# Patient Record
Sex: FEMALE | Race: WHITE | HISPANIC OR LATINO | Employment: FULL TIME | URBAN - METROPOLITAN AREA
[De-identification: names, ages, dates, MRNs, and addresses within clinical notes are randomized per-mention and may not be internally consistent; named-entity substitution may affect disease eponyms.]

---

## 2017-02-23 PROBLEM — E89.3 HYPOPITUITARISM AFTER ADENOMA RESECTION: Status: ACTIVE | Noted: 2017-02-23

## 2017-06-01 PROBLEM — R73.02 IGT (IMPAIRED GLUCOSE TOLERANCE): Chronic | Status: ACTIVE | Noted: 2017-06-01

## 2019-12-02 PROBLEM — E89.3 HYPOPITUITARISM AFTER ADENOMA RESECTION: Chronic | Status: ACTIVE | Noted: 2017-02-23

## 2020-11-17 PROBLEM — K62.5 RECTAL BLEEDING: Status: ACTIVE | Noted: 2020-11-17

## 2020-11-17 PROBLEM — Z86.010 HX OF ADENOMATOUS COLONIC POLYPS: Status: ACTIVE | Noted: 2020-11-17

## 2021-01-20 ENCOUNTER — TELEPHONE (OUTPATIENT)
Dept: ADMINISTRATIVE | Facility: OTHER | Age: 46
End: 2021-01-20

## 2021-01-20 ENCOUNTER — PATIENT MESSAGE (OUTPATIENT)
Dept: ADMINISTRATIVE | Facility: OTHER | Age: 46
End: 2021-01-20

## 2021-07-13 PROBLEM — R21 RASH AND NONSPECIFIC SKIN ERUPTION: Status: ACTIVE | Noted: 2021-07-13

## 2022-01-18 PROBLEM — B35.1 ONYCHOMYCOSIS: Status: ACTIVE | Noted: 2022-01-18

## 2022-02-09 ENCOUNTER — OFFICE VISIT (OUTPATIENT)
Dept: ORTHOPEDICS | Facility: CLINIC | Age: 47
End: 2022-02-09

## 2022-02-09 DIAGNOSIS — M75.51 BURSITIS OF RIGHT SHOULDER: Primary | ICD-10-CM

## 2022-02-09 PROCEDURE — 99213 OFFICE O/P EST LOW 20 MIN: CPT | Mod: PBBFAC | Performed by: PHYSICIAN ASSISTANT

## 2022-02-09 PROCEDURE — 99999 PR PBB SHADOW E&M-EST. PATIENT-LVL III: CPT | Mod: PBBFAC,,, | Performed by: PHYSICIAN ASSISTANT

## 2022-02-09 PROCEDURE — 99203 OFFICE O/P NEW LOW 30 MIN: CPT | Mod: S$PBB,,, | Performed by: PHYSICIAN ASSISTANT

## 2022-02-09 PROCEDURE — 99203 PR OFFICE/OUTPT VISIT, NEW, LEVL III, 30-44 MIN: ICD-10-PCS | Mod: S$PBB,,, | Performed by: PHYSICIAN ASSISTANT

## 2022-02-09 PROCEDURE — 99999 PR PBB SHADOW E&M-EST. PATIENT-LVL III: ICD-10-PCS | Mod: PBBFAC,,, | Performed by: PHYSICIAN ASSISTANT

## 2022-02-10 NOTE — PROGRESS NOTES
SUBJECTIVE:     Chief Complaint & History of Present Illness:  Rachana Lee is a  New  patient 46 y.o. female who is seen here today with a complaint of    Chief Complaint   Patient presents with    Right Shoulder - Pain    .  Patient is here today for continuing care treatment for sudden onset right shoulder pain following lifting and caring multiple boxes.  Was seen in the emergency room 02/07/2022.  Examination x-rays at that time diagnosed with bursitis per discharge with ibuprofen 800 mg.  Continues to struggle with increasing pain and decreasing range of motion of the shoulder.  On a scale of 1-10, with 10 being worst pain imaginable, he rates this pain as 5 on good days and 8 on bad days.  she describes the pain as tender and sore.    Review of patient's allergies indicates:   Allergen Reactions    Pineapple Anaphylaxis     If eats and rash if touch         Current Outpatient Medications   Medication Sig Dispense Refill    amitriptyline (ELAVIL) 10 MG tablet TAKE 1 TABLET BY MOUTH NIGHTLY AS NEEDED FOR INSOMNIA 30 tablet 11    hydrocortisone (ANUSOL-HC) 2.5 % rectal cream Place rectally 2 (two) times daily. 1 Tube 2    ibuprofen (ADVIL,MOTRIN) 800 MG tablet Take 1 tablet (800 mg total) by mouth every 6 (six) hours as needed for Pain. 20 tablet 0    levothyroxine (SYNTHROID) 137 MCG Tab tablet TAKE 1 TABLET BY MOUTH EVERY DAY BEFORE BREAKFAST 90 tablet 3    lisinopriL 10 MG tablet TAKE 1 TABLET BY MOUTH EVERY EVENING 90 tablet 3    lovastatin (MEVACOR) 20 MG tablet Take 1 tablet (20 mg total) by mouth every evening. 90 tablet 3    omega-3 fatty acids/fish oil (FISH OIL-OMEGA-3 FATTY ACIDS) 300-1,000 mg capsule Take 2 capsules by mouth 2 (two) times daily. Take as directed to treat hypertriglyceridemia and to reduce the risk of cardiovascular disease. 360 capsule 3    predniSONE (DELTASONE) 5 MG tablet Take 1 tablet (5 mg total) by mouth every evening. 90 tablet 3    terbinafine  HCL (LAMISIL) 250 mg tablet Take 1 tablet (250 mg total) by mouth once daily. 90 tablet 1    omega-3 fatty acids 1,000 mg Cap Take 2 capsules (2,000 mg total) by mouth 2 (two) times daily. Take as directed for triglycerides and to decrease risk of heart disease and stroke.  0     No current facility-administered medications for this visit.       Past Medical History:   Diagnosis Date    Acromegaly     Cortisol deficiency     Encounter for blood transfusion     High cholesterol     Hypertension     Thyroid disease        Past Surgical History:   Procedure Laterality Date     SECTION      COLONOSCOPY N/A 11/3/2016    Procedure: COLONOSCOPY;  Surgeon: Luis Bogran-Reyes, MD;  [facility];  Service: Endoscopy;  Laterality: N/A;    COLONOSCOPY N/A 2020    Procedure: COLONOSCOPY;  Surgeon: Allan Bliss MD;  [facility];  Service: General;  Laterality: N/A;    PITUITARY SURGERY      in [place]        Vital Signs (Most Recent)  There were no vitals filed for this visit.    Review of Systems:  ROS:  Constitutional: no fever or chills  Eyes: no visual changes  ENT: no nasal congestion or sore throat  Respiratory: no cough or shortness of breath  Cardiovascular: no chest pain or palpitations  Gastrointestinal: no nausea or vomiting, tolerating diet, Tubular adenoma of the colon,  Genitourinary: no hematuria or dysuria  Integument/Breast: no rash or pruritis  Hematologic/Lymphatic: no easy bruising or lymphadenopathy  Musculoskeletal: no arthralgias or myalgias  Neurological: no seizures or tremors  Behavioral/Psych: no auditory or visual hallucinations  Endocrine: no heat or cold intolerance, Positive thyroid disease, cortisol deficiency acromegaly      OBJECTIVE:     PHYSICAL EXAM:     , General Appearance: Well nourished, well developed, in no acute distress.  Neurological: Mood & affect are normal.  Shoulder exam: right  Tenderness: AC joint, biceps tendon, lateral  acromial  ROM: forward flexion 180/180, extension 45/45, full abduction 160/160, abduction-glenohumeral 70/70, external rotation 50/50, pain at the extremes of mobility  Shoulder Strength: biceps 5/5, triceps 5/5, abduction 5/5, adduction 5/5, external rotation 5/5 with shoulder at side, flexion 5/5, and extension 5/5  positive for tenderness about the glenohumeral joint, positive for tenderness over the acromioclavicular joint and negative for impingement sign  Stability tests: anterior apprehension test positive for pain only and posterior apprehension test negative  Special Tests:Cross-chest abduction: diffuse pain and Dickinson's test: negative                     RADIOGRAPHS:  X-rays from emergency room reviewed by me today demonstrate no evidence of fracture dislocation or about the shoulder mild degenerative joint disease noted the glenohumeral region with mild narrowing no evidence of impingement    ASSESSMENT/PLAN:       ICD-10-CM ICD-9-CM   1. Bursitis of right shoulder  M75.51 726.10       Plan: We discussed with the patient at length all the different treatment options available for her right shoulder including anti-inflammatories, acetaminophen, rest, ice, Physical therapy to include strengthening exercise, occasional cortisone injections for temporary relief, arthroscopic surgical repair, and finally shoulder arthroplasty.   That proceed with a therapeutic diagnostic cortisone injection of the right shoulder followed by course of physical therapy  Patient is currently on chemotherapy with Sandostatin for acromegaly she reports she was instructed by her endocrinologist to contact them prior to beginning any steroid therapies. Dr. Johnson was contacted and has agreed with the treatment plan.

## 2022-02-14 ENCOUNTER — TELEPHONE (OUTPATIENT)
Dept: ORTHOPEDICS | Facility: CLINIC | Age: 47
End: 2022-02-14

## 2022-02-14 NOTE — TELEPHONE ENCOUNTER
Spoke with . Patient was wanting to know if it was okay for her to get the injection because she was scheduled with Ortho in [place] on 3/28. Explained to the  that after reviewing her office note-Ralph Wilson spoke with patients provider and okay to get injection. Appt too far out. Will call to see if can get in sooner than 3/28.    ----- Message from Dony Bergeron sent at 2/14/2022 11:29 AM CST -----  Contact: Francois herrera ) @ [phone]  Caller requesting a return phone call regarding the injection update, he also responding to a text message pt received, Please return call to discuss further

## 2023-01-24 PROBLEM — K64.0: Status: ACTIVE | Noted: 2023-01-24
